# Patient Record
(demographics unavailable — no encounter records)

---

## 2024-10-22 NOTE — DISCUSSION/SUMMARY
[FreeTextEntry1] :  18 yo male with MDD recurrent severe, hx of cutting behaviors, Social anxiety; Hx of Suicide attempt summer '24 with Psych ER safety assessment; Protective factors of supportive family and friends, looks to treatment favorably, as such with treatment adherence, prognosis is good. october '23: inc prozac to 20mg

## 2024-10-22 NOTE — PLAN
[No] : No [Medication education provided] : Medication education provided. [Rationale for medication choices, possible risks/precautions, benefits, alternative treatment choices, and consequences of non-treatment discussed] : Rationale for medication choices, possible risks/precautions, benefits, alternative treatment choices, and consequences of non-treatment discussed with patient/family/caregiver  [FreeTextEntry5] : -psychoeducation about diagnosis and treatment modalities, alternatives to recommended treatment, risk Vs benefits of treatment and no treatment and alternative treatments. - continue weekly behavior therapy, hippa release for new therapist.  -Medication: prozac inc 20mg QAM  -Safety:Emergency procedures were discussed: pt. educated to call 911 or go to nearest ER for worsening of symptoms/suicidal ideation. -Patient given opportunity to ask questions and their questions were answered and they expressed understanding and agreement with above plan. -Follow up: RTC in 2-3 weeks for medication management or earlier as needed      Psychotherapy documentation: Time spent for Psychotherapy: 22 minutes Modality: Supportive psychotherapy and psychoeducation and DBT skills Goal: Reduction in symptoms of depression and anxiety. Reduce isolation. Increase activities that lift his mood. Focus is session: 1. Discussed with the patient his daily activities that help elevate his mood and the behaviors and symptoms that interfere with his ability to maintain a daily routine and structure to reduce isolation, relationship building and socialization with other people in his life; harm reduction coping skills. 2. Psychoeducation about medications, risk vs benefits of medications, role of behavior activation and coping skills to help in improving his mood and anxiety symptoms and daily functioning.

## 2024-10-22 NOTE — DISCUSSION/SUMMARY
[FreeTextEntry1] :  16 yo male with MDD recurrent severe, hx of cutting behaviors, Social anxiety; Hx of Suicide attempt summer '24 with Psych ER safety assessment; Protective factors of supportive family and friends, looks to treatment favorably, as such with treatment adherence, prognosis is good. october '23: inc prozac to 20mg

## 2024-10-22 NOTE — PHYSICAL EXAM
[Intermittent] : intermittent [Slowed] : slowed [Cooperative] : cooperative [Depressed] : depressed [Anxious] : anxious [Constricted] : constricted [Clear] : clear [Soft] : soft [Linear/Goal Directed] : linear/goal directed [None] : none [Preoccupations/Ruminations] : preoccupations/ruminations [Depressive] : depressive [None Reported] : none reported [Above average] : above average [WNL] : within normal limits [Positive interaction] : positive interaction [Unremarkable/age appropriate] : unremarkable/age appropriate [de-identified] : improved  [de-identified] : slightly more range

## 2024-10-22 NOTE — REASON FOR VISIT
[Patient with collateral] : Patient with collateral  [Mother] : mother [FreeTextEntry1] : depression  [TextEntry] : This visit was provided via telehealth using real-time 2-way audio visual technology HIPPA compliant [facility].  The patient, ANAMIKA STREET , was located at home, [address] [place] [address] , at the time of the visit. The provider, TEAGAN FLOWER, was located at the medical office [place] in [place] at the time of the visit.  The patient, ANAMIKA STREET and Provider participated in the telehealth encounter. Parent also participated.   Verbal consent given on Oct 22, 2024   by the Parent with patient ANAMIKA STREET  assent.

## 2024-10-22 NOTE — SOCIAL HISTORY
[FreeTextEntry1] : [place]  ; lived with bio parents till 2 years, parents , then lives with step dad, mom , younger step siblings; Attended grade school [employer] with no academic accommodations; Feels family supportive; interests in drama/choir/psychology ; started college [employer] fall '24, major in psychology

## 2024-10-22 NOTE — HISTORY OF PRESENT ILLNESS
[FreeTextEntry1] : Patient is a 18 yo male, domiciled with bio mother, step father and younger half siblings, currently enrolled at [employer] major ; currently not in outpatient treatment, no prior psychiatric hospitalization, + hx of chronic intermittent self-injury cutting behavior and suicide attempt (July '24), no aggression/violence, no substance use, no legal issues, no CPS involvement, no trauma/abuse, no PMH, presenting today with mother for depression and anxiety eval and med mgmt.   Pt explains he thinks he has had lifelong sx of social anxiety and symptoms of depression intermittent since 5th grade; He remembers thinking in 5th grade that he "didn't fit in" and felt he had to work harder at social interactions; In middle school 8th grade he thinks he experienced his first depressive episode; Recalls he lost interest and motivation in [employer] club and though schoolwork wasn't "hard" he lacked motivation to participate; He is aware he loses appetite as well during his bouts of depression with last few weeks to few months intermittently throughout high school; Pt thinks he's suffered from chronic passive SI since middle school; He remember he first cut himself without any SI intent in 8th grade when he felt left out of his then friend group; He continued to have intermittent self harm through high school and thinks he was influenced by a friend at that time who would send him "disturbing images of cutting or mutilation" ; By senior year he felt he wanted to distance himself from the person and wanted to be friends with peers in the choir, however had significant social anxiety.  He states he reached out to his school psychologist senior year who helped with some counseling and coping skills; They encouraged him to enroll in their group therapy as well, however pt states he was too scared to attend. By March '24 pt thinks he started caring less about school, poor ADL, and didn't attend his high school graduation ; States he went to live with Great grandparents over the summer because mother was worried about his mental health, he thought mother didn't want him at home; While in [place] with GP over the summer he attempted sucide by overdosing on iron tablets; he then regretted and told his grandparent and father; He was taken to Psychiatric ER where safety assessment performed, offered vounterary hospital stay, but patient did not want to; agreement to return home to mom and seek outpt tx here;  Pt thinks since he's been home last 2 weeks feels no SI, however still depressed and anxious; He is worried he won't be able to make friends in college; Pt agreeable to safety planning with parent present today.  Pt denies any hx or current concern for brissa/hypomania/psychosi/OCD/trauma/substance use/eating disorder [FreeTextEntry2] : summer '24: psych ER safety assessment in [place] while living with family there for Suicide Attempt overdose on iron pills (8)  [FreeTextEntry3] : none

## 2024-10-22 NOTE — HISTORY OF PRESENT ILLNESS
[FreeTextEntry1] : Patient is a 18 yo male, domiciled with bio mother, step father and younger half siblings, currently enrolled at [employer] major ; currently not in outpatient treatment, no prior psychiatric hospitalization, + hx of chronic intermittent self-injury cutting behavior and suicide attempt (July '24), no aggression/violence, no substance use, no legal issues, no CPS involvement, no trauma/abuse, no PMH, presenting today with mother for depression and anxiety eval and med mgmt.   Pt explains he thinks he has had lifelong sx of social anxiety and symptoms of depression intermittent since 5th grade; He remembers thinking in 5th grade that he "didn't fit in" and felt he had to work harder at social interactions; In middle school 8th grade he thinks he experienced his first depressive episode; Recalls he lost interest and motivation in [employer] club and though schoolwork wasn't "hard" he lacked motivation to participate; He is aware he loses appetite as well during his bouts of depression with last few weeks to few months intermittently throughout high school; Pt thinks he's suffered from chronic passive SI since middle school; He remember he first cut himself without any SI intent in 8th grade when he felt left out of his then friend group; He continued to have intermittent self harm through high school and thinks he was influenced by a friend at that time who would send him "disturbing images of cutting or mutilation" ; By senior year he felt he wanted to distance himself from the person and wanted to be friends with peers in the choir, however had significant social anxiety.  He states he reached out to his school psychologist senior year who helped with some counseling and coping skills; They encouraged him to enroll in their group therapy as well, however pt states he was too scared to attend. By March '24 pt thinks he started caring less about school, poor ADL, and didn't attend his high school graduation ; States he went to live with Great grandparents over the summer because mother was worried about his mental health, he thought mother didn't want him at home; While in [place] with GP over the summer he attempted sucide by overdosing on iron tablets; he then regretted and told his grandparent and father; He was taken to Psychiatric ER where safety assessment performed, offered vounterary hospital stay, but patient did not want to; agreement to return home to mom and seek outpt tx here;  Pt thinks since he's been home last 2 weeks feels no SI, however still depressed and anxious; He is worried he won't be able to make friends in college; Pt agreeable to safety planning with parent present today.  Pt denies any hx or current concern for brissa/hypomania/psychosi/OCD/trauma/substance use/eating disorder [FreeTextEntry2] : summer '24: psych ER safety assessment in [place] while living with family there for Suicide Attempt overdose on iron pills (8)  [FreeTextEntry3] : none

## 2024-10-22 NOTE — REASON FOR VISIT
[Patient with collateral] : Patient with collateral  [Mother] : mother [FreeTextEntry1] : depression  [TextEntry] : This visit was provided via telehealth using real-time 2-way audio visual technology HIPPA compliant [facility].  The patient, ANAMIKA STREET , was located at home, [address] [place] [address] , at the time of the visit. The provider, TEAGAN FLOWER, was located at the medical [place] at the time of the visit.  The patient, ANAMIKA STREET and Provider participated in the telehealth encounter. Parent also participated.   Verbal consent given on Oct 22, 2024   by the Parent with patient ANAMIKA STREET  assent.

## 2024-10-22 NOTE — PHYSICAL EXAM
[Intermittent] : intermittent [Slowed] : slowed [Cooperative] : cooperative [Depressed] : depressed [Anxious] : anxious [Constricted] : constricted [Clear] : clear [Soft] : soft [Linear/Goal Directed] : linear/goal directed [None] : none [Preoccupations/Ruminations] : preoccupations/ruminations [Depressive] : depressive [None Reported] : none reported [Above average] : above average [WNL] : within normal limits [Positive interaction] : positive interaction [Unremarkable/age appropriate] : unremarkable/age appropriate [de-identified] : improved  [de-identified] : slightly more range

## 2024-11-04 NOTE — HISTORY OF PRESENT ILLNESS
[FreeTextEntry1] : Patient is a 16 yo male, domiciled with bio mother, step father and younger half siblings, currently enrolled at [employer] major ; currently not in outpatient treatment, no prior psychiatric hospitalization, + hx of chronic intermittent self-injury cutting behavior and suicide attempt (July '24), no aggression/violence, no substance use, no legal issues, no CPS involvement, no trauma/abuse, no PMH, presenting today with mother for depression and anxiety eval and med mgmt.   Pt explains he thinks he has had lifelong sx of social anxiety and symptoms of depression intermittent since 5th grade; He remembers thinking in 5th grade that he "didn't fit in" and felt he had to work harder at social interactions; In middle school 8th grade he thinks he experienced his first depressive episode; Recalls he lost interest and motivation in [employer] club and though schoolwork wasn't "hard" he lacked motivation to participate; He is aware he loses appetite as well during his bouts of depression with last few weeks to few months intermittently throughout high school; Pt thinks he's suffered from chronic passive SI since middle school; He remember he first cut himself without any SI intent in 8th grade when he felt left out of his then friend group; He continued to have intermittent self harm through high school and thinks he was influenced by a friend at that time who would send him "disturbing images of cutting or mutilation" ; By senior year he felt he wanted to distance himself from the person and wanted to be friends with peers in the choir, however had significant social anxiety.  He states he reached out to his school psychologist senior year who helped with some counseling and coping skills; They encouraged him to enroll in their group therapy as well, however pt states he was too scared to attend. By March '24 pt thinks he started caring less about school, poor ADL, and didn't attend his high school graduation ; States he went to live with Great grandparents over the summer because mother was worried about his mental health, he thought mother didn't want him at home; While in [place] with GP over the summer he attempted sucide by overdosing on iron tablets; he then regretted and told his grandparent and father; He was taken to Psychiatric ER where safety assessment performed, offered vounterary hospital stay, but patient did not want to; agreement to return home to mom and seek outpt tx here;  Pt thinks since he's been home last 2 weeks feels no SI, however still depressed and anxious; He is worried he won't be able to make friends in college; Pt agreeable to safety planning with parent present today.  Pt denies any hx or current concern for brissa/hypomania/psychosi/OCD/trauma/substance use/eating disorder [FreeTextEntry2] : summer '24: psych ER safety assessment in [place] while living with family there for Suicide Attempt overdose on iron pills (8)  [FreeTextEntry3] : none

## 2024-11-04 NOTE — REASON FOR VISIT
[Patient with collateral] : Patient with collateral  [Mother] : mother [FreeTextEntry1] : anxiety depression follow up  [TextEntry] : This visit was provided via telehealth using real-time 2-way audio visual technology HIPPA compliant [facility].  The patient, ANAMIKA STREET , was located at home, [address] [place] [address] , at the time of the visit. The provider, TEAGAN FLOWER, was located at the medical [place] at the [place] of the visit.  The patient, ANAMIKA STREET and Provider participated in the telehealth encounter.  Verbal consent given on Nov 04, 2024   by the Parent with patient ANAMIKA STREET  assent.

## 2024-11-04 NOTE — PAST MEDICAL HISTORY
Patient: Lilo Green Date: 2023   : 1958 Attending: No att. providers found   65 year old female      Chief Complaint   Patient presents with   • Office Visit   • Follow-up     Post dialysis fuv      INITIAL EVAL/HPI: Lilo is a 65 year old female who presents today for ivy , fu from HD ,   Original consult as follows  This is a 65 year old female with a past medical history significant for COPD, liver cirrhosis, diabetes, hypertension and CKD stage IIIB who presents for evaluation of generalized weakness, shortness of breath and abdominal bloating.  Two weeks ago she started to feel weak with dyspnea on exertion, stopped all her medication, few days later she noticed bright red blood in her stool, so she resumed her meds for 2 days however, she continued to feel weak and short of breath with persistent loose/black stool.  In the ED she was hypotensive and pale, hemoglobin was 5, with severe IVY.  She was transfused blood, started on IV fluid and admitted for further workup and management.  GI and Nephrology consulted.     Patient today's up in chair, feels better, on room air, no shortness of breath at rest, no bowel movement today.  Plan for EGD this afternoon.  Urine output around 450 cc since admission    2023  Has been on HD since 2023  Baseline creat in the mid 1.0s to 2.0s  Has ATN      Past Medical History:   Diagnosis Date   • Anemia    • COPD suggested by initial evaluation (CMD)     declines PFTs   • HTN (hypertension)      Past Surgical History:   Procedure Laterality Date   • Esophagogastroduodenoscopy transoral flex diag  2022    Dr. Schmid, Gastric Mucosa with Intestinal Metaplasia, Follow up 1 Year   • Esophagogastroduodenoscopy transoral flex diag  2023    Blackwell's mucosa.  No bleeding.  Dr. Guillremo   • Knee scope,diagnostic Right    • Open access colonoscopy  2022    NORMAL   • Upper arm/elbow surgery unlisted Right     broken     Social History      Socioeconomic History   • Marital status: /Civil Union     Spouse name: Not on file   • Number of children: Not on file   • Years of education: Not on file   • Highest education level: Not on file   Occupational History   • Not on file   Tobacco Use   • Smoking status: Every Day     Current packs/day: 2.00     Average packs/day: 2.0 packs/day for 49.0 years (98.0 ttl pk-yrs)     Types: Cigarettes   • Smokeless tobacco: Never   Vaping Use   • Vaping Use: never used   Substance and Sexual Activity   • Alcohol use: Yes     Alcohol/week: 21.0 standard drinks of alcohol     Types: 21 Standard drinks or equivalent per week   • Drug use: Never   • Sexual activity: Not Currently   Other Topics Concern   • Not on file   Social History Narrative   • Not on file     Social Determinants of Health     Financial Resource Strain: Low Risk  (8/15/2023)    Financial Resource Strain    • Social Determinants: Financial Resource Strain: None   Food Insecurity: No Food Insecurity (8/15/2023)    Food Insecurity    • Social Determinants: Food Insecurity: Never   Transportation Needs: No Transportation Needs (8/15/2023)    PRAPARE - Transportation    • Lack of Transportation (Medical): No    • Lack of Transportation (Non-Medical): No   Physical Activity: Not on file   Stress: Not on file   Social Connections: Socially Isolated (8/15/2023)    Social Connections    • Social Determinants: Social Connections: Less than once a week   Intimate Partner Violence: Not At Risk (8/15/2023)    Intimate Partner Violence    • Social Determinants: Intimate Partner Violence Past Fear: No    • Social Determinants: Intimate Partner Violence Current Fear: No     Family History   Problem Relation Age of Onset   • Diabetes Mother    • Hypertension Mother    • Alcohol Abuse Father         possible liver disease   • Cancer Father         uncertain   • Myocardial Infarction Brother 62   • Thyroid Brother      ALLERGIES:   Allergen Reactions   • Methoxy  Polyethylene Glycol-Epoetin Beta ANAPHYLAXIS     Patient unresponsive after given at HD clinic. CPR initiated         Medications:  Current Outpatient Medications   Medication Sig Dispense Refill   • furosemide (Lasix) 80 MG tablet Take 1 tablet by mouth 2 times daily. 60 tablet 6   • iron sucrose, VENOFER, (diluted) 1 MG/ML 50 mg by Infusion Catheter route 1 day a week.     • fluticasone-vilanterol (BREO ELLIPTA) 200-25 MCG/ACT inhaler Inhale 1 Puff by mouth daily. 60 each 3   • guaiFENesin (MUCINEX) 600 MG 12 hr tablet Take 2 tablets (1,200 mg total) by mouth every 12 hours for 4 doses. 8 tablet 0   • lidocaine (LIDODERM) 5 % patch Apply 2 patches topically every evening for 14 days. May wear patch(es) up to 12 hours. Must have 12 hours patch-free. 28 patch 0   • nicotine (NICODERM) 21 MG/24HR patch Apply 1 Patch (21 mg total) topically daily. 30 patch 0   • predniSONE (DELTASONE) 20 MG tablet Take 2 tablets (40 mg total) by mouth daily for 3 days. 6 tablet 0   • ipratropium-albuterol (DUONEB) 0.5-2.5 (3) MG/3ML nebulizer solution Take 3 mL via hand-held nebulizer 4 times daily as needed. 60 mL 3   • potassium CHLORIDE (KLOR-CON M) 20 MEQ oralia ER tablet Take 1 tablet by mouth daily. 30 tablet 5   • Specialty Vitamins Products (BIOTIN PLUS KERATIN PO) Take 1 tablet by mouth in the morning and 1 tablet in the evening.     • metoPROLOL succinate (TOPROL-XL) 50 MG 24 hr tablet Take 1 tablet by mouth daily. 30 tablet 5   • apixaBAN (Eliquis) 5 MG Tab Take 1 tablet by mouth every 12 hours. 60 tablet 5   • pantoprazole (PROTONIX) 40 MG tablet Take 1 tablet by mouth every 12 hours. 60 tablet 2   • AMIODarone (PACERONE) 200 MG tablet Take 1 tablet by mouth daily. 30 tablet 5   • Ascorbic Acid (vitamin C) 500 MG tablet Take 500 mg by mouth in the morning and 500 mg in the evening.     • cholecalciferol 1.25 mg (50,000 units) tablet Take 1 tablet by mouth every 30 days. 3 tablet 4   • ferrous sulfate 325 (65 FE) MG tablet  Take 1 tablet by mouth daily (with breakfast). 30 tablet 5   • albuterol 108 (90 Base) MCG/ACT inhaler Inhale 2 puffs into the lungs every 4 hours as needed for Shortness of Breath or Wheezing. 8.5 g 5   • folic acid (FOLATE) 1 MG tablet Take 1 tablet by mouth daily. 90 tablet 3   • acetaminophen (TYLENOL) 500 MG tablet Take 500 mg by mouth every 6 hours as needed for Pain.     • loratadine (CLARITIN) 10 MG tablet Take 10 mg by mouth daily as needed for Allergies.       No current facility-administered medications for this visit.       Immunization Status:  Immunization History   Administered Date(s) Administered   • COVID Moderna 0.5 mL 12Y+ 05/11/2021, 06/14/2021, 12/16/2021   • Influenza, High Dose quadrivalent, preserve-free 09/19/2023   • Influenza, quadrivalent, preserve-free 11/25/2022   • Pneumococcal Polysaccharide Vacc (Pneumovax 23) 01/27/2023     Immunization History   Administered Date(s) Administered   • COVID Moderna 0.5 mL 12Y+ 05/11/2021, 06/14/2021, 12/16/2021   • Influenza, High Dose quadrivalent, preserve-free 09/19/2023   • Influenza, quadrivalent, preserve-free 11/25/2022   • Pneumococcal Polysaccharide Vacc (Pneumovax 23) 01/27/2023       ROS: All systems reviewed and negative except for what is mentioned in the HPI.        8/23/2023     4:04 PM 8/23/2023     4:10 PM 8/29/2023     4:05 PM 9/19/2023     2:30 PM 10/10/2023     3:54 PM 10/10/2023     3:57 PM 11/20/2023     2:06 PM   Vitals   SYSTOLIC 145 148 128 132 170 153 144   DIASTOLIC 60 52 62 62 66 70 70   Heart Rate 61 59 50 53 60  58   Temp 98.2 °F (36.8 °C)    98.1 °F (36.7 °C)     Resp 18  16 16 16     Weight kg 101 kg  100.517 kg 88.814 kg 96.7 kg  97.251 kg   BMI (Calculated) 43.49           Physical Exam:    Physical Exam:  GENERAL: ALERT ORIENTED X 3, NOT IN RESPIRATORY DISTRESS, APPEARS STATED AGE  EYES EOMI, NORMAL CONJUNCTIVA, NO ICTERUS  HENT: NORMOCEPHALLIC ATRAUMATIC, MUCOUS MEMBRANE MOIST, NO LESIONS  NECK: SUPPLE, TRACHEA  MIDLINE, NO ELEVATED JVD  CHEST: SYMMETRIC EXPANSION,NON LABORED,  CLEAR TO AUSCULATATION BILATERAL  HEART: REGULAR RATE & RHYTHM,  S1, S2, NO RUB  ABDOMEN: SOFT, NO DISTENTION, NON TENDER,  BS PRESENT  EXTREMITIES: NO  CYANOSIS, NOCLUBBING, NO EDEMA  SKIN: WARM, INTACT, NO RASH, NO LESIONS, NO NODULES  NEURO  , GROSSLY INTACT, NON-FOCAL  MUSCULAR: MOVES ALL EXTREMITIES, NORMAL ROM, NO DEFORMITY   PSYCHIATRIC COOPERATIVE, APPROPRIATE MOOD AND AFFECT    Laboratory Results:    Recent Labs     12/13/22  0959 12/22/22  0914 01/18/23  0611 01/19/23  0613 01/20/23  0606 01/21/23  0532 03/06/23  1035 03/28/23  1451 08/15/23  2111 08/16/23  0258 08/17/23  0417 08/18/23  0354 08/19/23  0437 08/20/23  0444 08/21/23  0548 10/03/23  1157 11/16/23  1152 11/20/23  0823   SODIUM 140   < >  --    < > 141   < > 139   < > 138 138   < > 137 136 138   < > 140 140 142   POTASSIUM 4.6   < > 4.5   < > 4.1   < > 4.6   < > 5.3* 5.3*   < > 3.9 3.7 3.8   < > 4.3 5.1 5.4*   CHLORIDE 103   < >  --    < > 102   < > 104   < > 103 104   < > 101 101 103   < > 107 106 107   CO2 28   < >  --    < > 33*   < > 30   < > 18* 18*   < > 23 23 22   < > 27 24 24   ANIONGAP 14   < >  --    < > 10   < > 10   < > 22* 21*   < > 17 16 17   < > 10 15 16   BUN 23*   < >  --    < > 40*   < > 25*   < > 142* 138*   < > 95* 56* 59*   < > 17 41* 53*   CREATININE 1.45*   < > 2.05*   < > 1.50*   < > 1.79*   < > 12.72* 12.77*   < > 9.53* 6.69* 7.47*   < > 3.14* 3.36* 3.25*   GLUCOSE 114*   < >  --    < > 285*   < > 110*   < > 116* 130*   < > 127* 105* 92   < > 100* 117* 111*   CALCIUM 9.9   < >  --    < > 8.7   < > 9.5   < > 9.3 9.0   < > 7.8* 7.5* 7.4*   < > 8.9 9.7 9.9   MG  --    < > 2.4  --  2.2  --   --    < >  --  2.3   < > 1.9 1.7 1.7  --   --   --   --    PHOS  --   --  4.6  --  3.6  --   --   --  6.3*  --   --   --   --   --   --   --   --   --    INTAC  --   --   --   --  102*  --   --   --   --   --   --   --   --   --   --   --   --   --    VITD25 14.9*  --    --   --  22.6*  --  31.8  --   --   --   --   --   --   --   --   --   --   --    ALBUMIN 3.0*   < >  --   --   --   --  2.8*   < > 2.8* 2.7*  --  2.7*  --   --   --  2.6*  --   --     < > = values in this interval not displayed.         Recent Labs     01/20/23  0606 01/21/23  0532 03/28/23  1451 04/11/23  1005 08/21/23  0548 08/23/23  0729 10/03/23  1157   WBC 11.8*   < > 9.7   < > 8.5 9.1 7.7   HGB 8.2*   < > 10.1*   < > 8.2* 8.2* 7.2*   HCT 27.3*   < > 31.8*   < > 25.0* 25.2* 24.2*   *   < > 303   < > 281 314 338   FERR 24  --  54  --   --   --   --    PST 3*  --  68*  --   --   --   --     < > = values in this interval not displayed.             Urine Panel  Recent Labs     01/16/23  2043 01/21/23  0305 08/15/23  1711   USPG 1.013  --  1.011   UPH 5.5  --  6.0   UPROT Negative  --  Negative   UROB 0.2  --  0.2   UNITR Positive*  --  Positive*   UKET Negative  --  Negative   UBILI Negative  --  Negative   UWBC Trace*  --  Small*   URBC Negative  --  Negative   KRYSTAL  --  22  --    UEOS  --  None Seen  --            Diagnosis/Plan:    # KRISTEN on CKD stage 3b-4 Likely ATN due to Hypotension/severe Anemia and diuretics use.   - Base line crea between 1.5-2, was 13.2 on admission.     STARTED HD 8/2023  SEEMED TO HAVE JUST ENOUGH CR CL TO TRY GETTING OFF HD    - UA bland.   - Renal US no hydro.   - Avoid Nephrotoxins, NSAIDs and IV contrast.   - Mahurkar placed by IR 08/17        # Acute Blood Loss Anemia- ? REACTION TO [facility]  # Liver Cirrhosis.   - Likely due to GI Bleed.   - GI following.   - S/P EGD 08/16 revealed AVM S/P Ablation  NEED TO RECHECK CBC     # Hypotension: Improved  # Mild Hyperkalemia: DC KCL  # Mild Metabolic Acidosis: Likely due to KRISTEN: STABLE  # Fluid overload, OK OK LASIX BID       F.U IN 2 WEEKS  HAD REACTION TO [facility] IN PAST    Romaine Peoples MD  [phone] (O) [phone]     [FreeTextEntry1] : Reviewed, unchanged since last visits

## 2024-11-04 NOTE — PHYSICAL EXAM
[Intermittent] : intermittent [Slowed] : slowed [Cooperative] : cooperative [Depressed] : depressed [Anxious] : anxious [Constricted] : constricted [Clear] : clear [Soft] : soft [Linear/Goal Directed] : linear/goal directed [None] : none [Preoccupations/Ruminations] : preoccupations/ruminations [Depressive] : depressive [None Reported] : none reported [Above average] : above average [WNL] : within normal limits [Positive interaction] : positive interaction [Unremarkable/age appropriate] : unremarkable/age appropriate [de-identified] : improved  [de-identified] : slightly more range  Patient/Caregiver provided printed discharge information.

## 2024-11-04 NOTE — PLAN
[No] : No [Medication education provided] : Medication education provided. [Rationale for medication choices, possible risks/precautions, benefits, alternative treatment choices, and consequences of non-treatment discussed] : Rationale for medication choices, possible risks/precautions, benefits, alternative treatment choices, and consequences of non-treatment discussed with patient/family/caregiver  [FreeTextEntry5] :  -psychoeducation about diagnosis and treatment modalities  - continue weekly behavior therapy, hippa release for new therapist. -Medication: prozac inc 20mg QAM -Safety: Emergency procedures were discussed: pt. educated to call 911 or go to nearest ER for worsening of symptoms/suicidal ideation. -Patient given opportunity to ask questions and their questions were answered and they expressed understanding and agreement with above plan. -Follow up: RTC in 3-4  weeks for medication management or earlier as needed      Psychotherapy documentation: Time spent for Psychotherapy: 16 minutes Modality: Supportive psychotherapy and psychoeducation ,DBT skills Goal: Reduction in symptoms of depression and anxiety. Reduce isolation. Increase activities that lift his mood. Focus is session: 1. Discussed with the patient his daily activities that help elevate his mood and the behaviors and symptoms that interfere with his ability to maintain a daily routine and structure to reduce isolation, relationship building and socialization with other people in his life; harm reduction coping skills. 2. Psychoeducation about medications, risk vs benefits of medications, role of behavior activation and coping skills to help in improving his mood and anxiety symptoms and daily functioning.